# Patient Record
Sex: FEMALE | Race: WHITE | NOT HISPANIC OR LATINO | URBAN - NONMETROPOLITAN AREA
[De-identification: names, ages, dates, MRNs, and addresses within clinical notes are randomized per-mention and may not be internally consistent; named-entity substitution may affect disease eponyms.]

---

## 2018-07-19 NOTE — PATIENT DISCUSSION
CATARACTS, OU - VISUALLY SIGNIFICANT. PT TO CALL WHEN READY TO PROCEED WITH PREOP AND SURGERY. SCHEDULE OU.

## 2018-07-24 NOTE — PATIENT DISCUSSION
CATARACTS, OU - VISUALLY SIGNIFICANT. SCHEDULE _OS_ FIRST THEN LATER IN _OD_ DISCUSSED OPTION OF __STD OU___VS _STD/MANUAL LRI OU___. PATIENT UNDERSTANDS AND DESIRES __TO Claus Gutierrez TO [address] [place] [address] AND LET US KNOW___________________________.

## 2018-07-24 NOTE — PATIENT DISCUSSION
Surgery Counseling: I have discussed the option of scheduling surgery versus following, as well as the risks, benefits and alternatives of cataract surgery with the patient. It was explained that the surgery is medically indicated at this time, and it can be performed at the patient's option as delaying will cause no further deterioration, therefore there is no rush and there is no harm in waiting to have surgery. It was also explained that there is no guarantee that removing the cataract will improve their vision. The patient understands and desires to proceed with cataract surgery with the implantation of an intraocular lens to improve vision for __DRIVING AND TV______________. I have given the patient the prescribed regimen of the all-in-one drop to use before and after cataract surgery. They have elected to use the all-in-one option of Pred/Gati/Brom(prednisolone acetate,gatifloxacin,and bromfenac. Patient to administer as directed.

## 2018-08-22 NOTE — PATIENT DISCUSSION
S/P PE IOL, _OS__. DOING WELL. CONTINUE PRED-GATI-BROM IN THE SURGICAL EYE  FOR A TOTAL OF 3 WEEKS USE THEN DISCONTINUE. PATIENT DESIRES _std with man lri______IOL FOR 2ND EYE. SCHEDULE CATARACT SURGERY.

## 2018-08-23 NOTE — PROCEDURE NOTE: SURGICAL
<p>Prior to commencing surgery patient identification, surgical procedure, site, and side were confirmed by Dr. Shai Serrano. Following topical proparacaine anesthesia, the patient was positioned at the YAG laser, a contact lens coupled to the cornea with methylcellulose and an axial posterior capsulotomy performed without complication using 3.0 Mj x 30. Excess methylcellulose was washed from the eye, one drop of Alphagan was instilled and the patient returned to the holding area having tolerated the procedure well and without complication. </p><p>MRN:[id number]</p>

## 2019-03-19 NOTE — PATIENT DISCUSSION
OHTN, OU:  ELEVATED INTRAOCULAR PRESSURE WITHOUT OPTIC NERVE CHANGES. RECOMMEND GONIOSCOPY AND VISUAL FIELD EVALUATION WITH CLOSE OBSERVATION. RETURN FOR FOLLOW-UP AS SCHEDULED.

## 2020-02-04 ENCOUNTER — IMPORTED ENCOUNTER (OUTPATIENT)
Dept: URBAN - NONMETROPOLITAN AREA CLINIC 1 | Facility: CLINIC | Age: 70
End: 2020-02-04

## 2020-02-04 PROBLEM — H40.1134: Noted: 2020-02-04

## 2020-02-04 PROBLEM — H52.4: Noted: 2020-02-04

## 2020-02-04 PROBLEM — Z96.1: Noted: 2020-02-04

## 2020-02-04 PROBLEM — H35.3111: Noted: 2020-02-04

## 2020-02-04 PROBLEM — H26.493: Noted: 2020-02-04

## 2020-02-04 PROCEDURE — 99204 OFFICE O/P NEW MOD 45 MIN: CPT

## 2020-02-04 PROCEDURE — 92015 DETERMINE REFRACTIVE STATE: CPT

## 2020-02-04 NOTE — PATIENT DISCUSSION
Pseudophakia OU with PCO OU - Discussed diagnosis in detail with patient- Surgery was done by Dr. Victor M Tovar at [facility] in 2018 with iStents- PCO OU noted but stable and no treatment needed at this time - Continue to monitorPOAG OU- Discussed diagnosis in detail with patient- Patient has been followed by Dr. Victor M Tovar at [facility] and is currently on Azopt BID OU and Brimonidine BID OU but states that she will stop using them 2-3 weeks at a time  - iStent not visable today - IOP today OD 11 and OS 13- Cup to Disc noted at OD .7 and OS . 6- Continue to monitor ARMD OD - Discussed diagnosis in detail with patient- Recommend  patient start eye vitamins daily such as Preservision. Sample given 2/4/20- Recommend that patient start following the [address] patient to call or come into the office ASAP if any changes noted from today.  Grid given with instructions on how to use 2/4/20 - Continue to monitor- RTC 1 month F/U with OCT MAC and ONH Hyperopia / Presbyopia OU - Discussed diagnosis in detail with patient- New glasses RX given today- Continue to monitor; Dr's Notes: MR 2/4/20DFE 2/4/20

## 2020-04-28 ENCOUNTER — IMPORTED ENCOUNTER (OUTPATIENT)
Dept: URBAN - NONMETROPOLITAN AREA CLINIC 1 | Facility: CLINIC | Age: 70
End: 2020-04-28

## 2020-04-28 PROBLEM — Z96.1: Noted: 2020-04-28

## 2020-04-28 PROBLEM — H40.1134: Noted: 2020-04-28

## 2020-04-28 PROBLEM — H52.4: Noted: 2020-02-04

## 2020-04-28 PROBLEM — H26.493: Noted: 2020-04-28

## 2020-04-28 PROBLEM — H35.3111: Noted: 2020-04-28

## 2020-04-28 PROCEDURE — 92133 CPTRZD OPH DX IMG PST SGM ON: CPT

## 2020-04-28 PROCEDURE — 92020 GONIOSCOPY: CPT

## 2020-04-28 PROCEDURE — 92014 COMPRE OPH EXAM EST PT 1/>: CPT

## 2020-04-28 NOTE — PATIENT DISCUSSION
POAG OU- Discussed diagnosis in detail with patient- Patient has been followed by Dr. Aparna Howell at [facility] and is currently on Azopt BID OU and Brimonidine BID OU. Patient states today that she self D/V Azopt and is only using the Brimonidine BID OU- Patient may D/C Azopt at this time and to continue Brrimonidine - IOP today 11 OU- Cup to Disc noted at OD .7 and OS . 6- OCT done today OD shows Inferior NFL Thinning and OS shows No NFL thinning - Gonio done today Grade IV with light pigment OU iStent not visable today - Continue to monitor ARMD OD - Discussed diagnosis in detail with patient- Recommend  patient continue eye vitamins daily such as Preservision. Sample given2/4/20- Recommend that patient continue following the [address] patient to call or come into the office ASAP if any changes noted from today.  Grid given with instructions on how to use 2/4/20 - Continue to monitorPseudophakia OU with PCO OU - Discussed diagnosis in detail with patient- Surgery was done by Dr. Aparna Howell at [facility] in 2018 with iStents- PCO OU noted but stable and no treatment needed at this time - Continue to monitorHyperopia / Presbyopia OU - Discussed diagnosis in detail with patient- Continue to monitor Would like to get Dr. Shawanda Cedillo records 4/28/20; Dr's Notes: MR 2/4/20DFE 2/4/20OCT Valentino Bhardwaj 72 and MAC 4/28/20Gonio 4/28/20

## 2020-08-10 ENCOUNTER — IMPORTED ENCOUNTER (OUTPATIENT)
Dept: URBAN - NONMETROPOLITAN AREA CLINIC 1 | Facility: CLINIC | Age: 70
End: 2020-08-10

## 2020-08-10 PROBLEM — H26.493: Noted: 2020-04-28

## 2020-08-10 PROBLEM — H16.223: Noted: 2020-08-10

## 2020-08-10 PROBLEM — H35.3111: Noted: 2020-04-28

## 2020-08-10 PROBLEM — H52.4: Noted: 2020-08-10

## 2020-08-10 PROBLEM — Z96.1: Noted: 2020-04-28

## 2020-08-10 PROBLEM — H40.1134: Noted: 2020-04-28

## 2020-08-10 PROCEDURE — 92083 EXTENDED VISUAL FIELD XM: CPT

## 2020-08-10 PROCEDURE — 92133 CPTRZD OPH DX IMG PST SGM ON: CPT

## 2020-08-10 PROCEDURE — 99213 OFFICE O/P EST LOW 20 MIN: CPT

## 2020-08-10 PROCEDURE — 76514 ECHO EXAM OF EYE THICKNESS: CPT

## 2020-08-10 NOTE — PATIENT DISCUSSION
POAG OU Moderate- Discussed diagnosis in detail with patient- Patient has been followed by Dr. Luisa Cannon at [facility] and wascurrently on Azopt BID OU and Brimonidine BID OU. Patient states at last visit that she self D/C Azopt and was only using the Brimonidine BID OU- Istents OS done by Dr. Luisa Cannon at [facility]- Patient may D/C Azopt at this time and to continue Brrimonidine BID OU - IOP today 12 OU- Cup to Disc noted at OD .7 and OS . 6- OCT done today OD shows Inferior NFL Thinning and OS shows No NFL thinning - PACHY done today  and - Gonio done previously Grade IV with light pigment OU iStent not visable today - VF done today OD shows Fair field with Non Specific defects and OS shows Fair field with Nasal Defect - D/C Brimonidine BID OU- Start Travatan QHS OU  sample given today - Continue to monitor ARMD OD - Discussed diagnosis in detail with patient- Recommend  patient continue eye vitamins daily such as Preservision. Sample given 2/4/20- Recommend that patient continue following the [address] patient to call or come into the office ASAP if any changes noted from today.  Grid given with instructions on how to use 2/4/20 - Continue to monitorPseudophakia OU with PCO OU - Discussed diagnosis in detail with patient- Surgery was done by Dr. Luisa Cannon at [facility] in 2018 with iStents- PCO OU noted but stable and no treatment needed at this time - Continue to monitorDES OU- Discussed diagnosis in detail with patient- Discussed signs and symptoms of progression- Recommend patient drinking plenty of water and starting Omega 3’s - Recommend Refresh Relieva or Systane  throughout the day- Consider plugs in the future if no improvement- Recommend J & J baby shampoo to scrub lids daily - Continue to monitorHyperopia / Presbyopia OU - Discussed diagnosis in detail with patient- Continue to monitor; Dr's Notes: MR 2/4/20DFE 2/4/20OCT Valentino Bhardwaj 72 8/10/20VF 8/10/20PACHY 8/10/20Gonio 4/28/20

## 2021-03-15 ENCOUNTER — IMPORTED ENCOUNTER (OUTPATIENT)
Dept: URBAN - NONMETROPOLITAN AREA CLINIC 1 | Facility: CLINIC | Age: 71
End: 2021-03-15

## 2021-03-15 PROCEDURE — 92133 CPTRZD OPH DX IMG PST SGM ON: CPT

## 2021-03-15 PROCEDURE — 92014 COMPRE OPH EXAM EST PT 1/>: CPT

## 2021-03-15 NOTE — PATIENT DISCUSSION
POAG OU Moderate- Discussed diagnosis in detail with patient- Patient has been followed by Dr. Annemarie Jansen at [facility] and wascurrently on Azopt BID OU and Brimonidine BID OU. Patient states at last visit that she self D/C Azopt and was only using the Brimonidine BID OU- Istents OS done by Dr. Annemarie Jansen at [facility]- Patient may D/C Azopt at this time and to continue Brrimonidine BID OU - IOP today 14 OU- Cup to Disc noted at OD .7 and OS . 6- OCT done today OD shows Inferior NFL Thinning and OS shows No NFL thinning - PACHY done previously  and - Gonio done previously Grade IV with light pigment OU iStent not visable today - VF done previously OD shows Fair field with Non Specific defects and OS shows Fair field with Nasal Defect - D/C Brimonidine BID OU- Continue Travatan QHS OU - Continue to monitor ARMD OD - Discussed diagnosis in detail with patient- Recommend  patient continue eye vitamins daily such as Preservision. Sample given 2/4/20- Recommend that patient continue following the [address] patient to call or come into the office ASAP if any changes noted from today.  Grid given with instructions on how to use 2/4/20 - Continue to monitorPseudophakia OU with PCO OU - Discussed diagnosis in detail with patient- Surgery was done by Dr. Annemarie Jansen at [facility] in 2018 with iStents- PCO OU noted but stable and no treatment needed at this time - Continue to monitorDES OU- Discussed diagnosis in detail with patient- Discussed signs and symptoms of progression- Recommend patient drinking plenty of water and starting Omega 3’s - Recommend Refresh Relieva or Systane  throughout the day- Consider plugs in the future if no improvement- Recommend J & J baby shampoo to scrub lids daily - Continue to monitorHyperopia / Presbyopia OU - Discussed diagnosis in detail with patient- Continue to monitor; Dr's Notes: MR 2/4/20DFE 2/4/20OCT Valentino Bhardwaj 72 3/15/21VF 8/10/20PACHY 8/10/20Gonio 4/28/20

## 2021-12-21 ENCOUNTER — IMPORTED ENCOUNTER (OUTPATIENT)
Dept: URBAN - NONMETROPOLITAN AREA CLINIC 1 | Facility: CLINIC | Age: 71
End: 2021-12-21

## 2021-12-21 PROCEDURE — 99214 OFFICE O/P EST MOD 30 MIN: CPT

## 2021-12-21 PROCEDURE — 92133 CPTRZD OPH DX IMG PST SGM ON: CPT

## 2021-12-21 PROCEDURE — 92015 DETERMINE REFRACTIVE STATE: CPT

## 2021-12-21 NOTE — PATIENT DISCUSSION
POAG OU Moderate- Discussed diagnosis in detail with patient- Patient has been followed by Dr. Ariela Stauffer at [facility] and wascurrently on Azopt BID OU and Brimonidine BID OU. Patient states at last visit that she self D/C Azopt and was only using the Brimonidine BID OU- Istents OS done by Dr. Ariela Stauffer at [facility]- Patient may D/C Azopt at this time and to continue Brrimonidine BID OU - IOP today 14 OU- Cup to Disc noted at OD .7 and OS . 6- OCT done today OD shows Inferior NFL Thinning and OS shows No NFL thinning - PACHY done previously  and - Gonio done previously Grade IV with light pigment OU iStent not visable today - VF done previously OD shows Fair field with Non Specific defects and OS shows Fair field with Nasal Defect - D/C Brimonidine BID OU- Continue Travatan QHS OU - Continue to monitor ARMD OD - Discussed diagnosis in detail with patient- Recommend  patient continue eye vitamins daily such as Preservision. Sample given 2/4/20- Recommend that patient continue following the [address] patient to call or come into the office ASAP if any changes noted from today.  Grid given with instructions on how to use 2/4/20 - Continue to monitorPseudophakia OU with PCO OU - Discussed diagnosis in detail with patient- Surgery was done by Dr. Ariela Stauffer at [facility] in 2018 with iStents- PCO OU noted but stable and no treatment needed at this time - Continue to monitorDES OU- Discussed diagnosis in detail with patient- Discussed signs and symptoms of progression- Recommend patient drinking plenty of water and starting Omega 3’s - Recommend Refresh Relieva or Systane  throughout the day- Consider plugs in the future if no improvement- Recommend J & J baby shampoo to scrub lids daily - Continue to monitorHyperopia / Presbyopia OU - Discussed diagnosis in detail with patient- MR re-checked by Dr. Giovanni Peterson and new glasses Rx given - Continue to monitor; Dr's Notes: MR 2/4/20DFE 2/4/20OCT Valentino Bhardwaj 72 12/21/21VF 8/10/20PACHY 8/10/20Gonio 4/28/20

## 2022-04-09 ASSESSMENT — VISUAL ACUITY
OS_SC: 20/25+
OD_SC: 20/50
OS_SC: 20/20
OD_SC: 20/20
OD_SC: 20/25
OS_SC: 20/29
OS_SC: 20/25-
OD_SC: 20/20
OS_CC: 20/29
OD_GLARE: 20/250
OD_PH: 20/29
OS_SC: 20/30-2
OD_SC: 20/25-1
OS_GLARE: 20/63+

## 2022-04-09 ASSESSMENT — PACHYMETRY
OS_CT_UM: 634
OS_CT_UM: 634
OD_CT_UM: 558
OD_CT_UM: 558

## 2022-04-09 ASSESSMENT — TONOMETRY
OS_IOP_MMHG: 14
OS_IOP_MMHG: 14
OD_IOP_MMHG: 11
OS_IOP_MMHG: 12
OD_IOP_MMHG: 14
OD_IOP_MMHG: 14
OD_IOP_MMHG: 12
OS_IOP_MMHG: 11

## 2022-07-01 ENCOUNTER — FOLLOW UP (OUTPATIENT)
Dept: URBAN - NONMETROPOLITAN AREA CLINIC 1 | Facility: CLINIC | Age: 72
End: 2022-07-01

## 2022-07-01 DIAGNOSIS — H40.1134: ICD-10-CM

## 2022-07-01 DIAGNOSIS — H35.3111: ICD-10-CM

## 2022-07-01 PROCEDURE — 99214 OFFICE O/P EST MOD 30 MIN: CPT

## 2022-07-01 PROCEDURE — 92133 CPTRZD OPH DX IMG PST SGM ON: CPT

## 2022-07-01 PROCEDURE — 92134 CPTRZ OPH DX IMG PST SGM RTA: CPT

## 2022-07-01 PROCEDURE — 92083 EXTENDED VISUAL FIELD XM: CPT

## 2022-07-01 ASSESSMENT — TONOMETRY
OD_IOP_MMHG: 12
OS_IOP_MMHG: 12

## 2022-07-01 ASSESSMENT — VISUAL ACUITY
OD_CC: 20/30-1
OS_CC: 20/30-2

## 2022-07-01 NOTE — PATIENT DISCUSSION
Importance of daily AREDS II study multivitamin and Amsler Grid checks discussed with patient. Patient to follow-up immediately with any new onset of decreased vision and/or metamorphopsia. Grid give today:  7/1/2022. Sample of PreserVision given today:  7/1/2022.

## 2022-07-01 NOTE — PATIENT DISCUSSION
VF done previously OD shows Fair field with Non Specific defects and OS shows Fair field with Nasal Defect.

## 2022-07-01 NOTE — PATIENT DISCUSSION
Explained to patient again today that her vision is worse in the left eye, without correction. There is nothing I can do about that, but glasses do help.

## 2022-07-01 NOTE — PATIENT DISCUSSION
Hx of iStent placement OS, done by Dr. Val Noriega at [facility].

## 2022-07-01 NOTE — PATIENT DISCUSSION
Patient has been followed by Dr. Rachel Santos at [facility] and was placed on Azopt BID OU and Brimonidine BID OU. Patient no longer uses these medications.

## 2023-02-24 NOTE — PATIENT DISCUSSION
Pre-Op 2nd Eye Counseling: The patient has noticed an improvement in their visual symptoms in the operative eye. The patient complains of decreased vision in the fellow eye when __DRIVING AND WATCHING TV__________. It was explained to the patient that the decision to proceed with cataract surgery in the fellow eye is entirely a separate decision from the surgical eye. All of the same risks, benefits and alternatives are reviewed with the patient again. The patient does feel the vision in the non-operative eye is limiting their daily activities and elects to proceed with cataract surgery in the ___RIGHT____ eye. . I have given the patient the prescribed regimen of  drops to use before and after cataract surgery. Patient to administer as directed. yes...

## 2024-01-15 ENCOUNTER — FOLLOW UP (OUTPATIENT)
Dept: URBAN - NONMETROPOLITAN AREA CLINIC 1 | Facility: CLINIC | Age: 74
End: 2024-01-15

## 2024-01-15 DIAGNOSIS — H40.1131: ICD-10-CM

## 2024-01-15 DIAGNOSIS — H52.4: ICD-10-CM

## 2024-01-15 DIAGNOSIS — H35.3111: ICD-10-CM

## 2024-01-15 PROCEDURE — 92083 EXTENDED VISUAL FIELD XM: CPT

## 2024-01-15 PROCEDURE — 99214 OFFICE O/P EST MOD 30 MIN: CPT

## 2024-01-15 PROCEDURE — 92015 DETERMINE REFRACTIVE STATE: CPT

## 2024-01-15 RX ORDER — TRAVOPROST 0.04 MG/ML: 1 SOLUTION/ DROPS OPHTHALMIC EVERY EVENING

## 2024-01-15 RX ORDER — DORZOLAMIDE 20 MG/ML: 1 SOLUTION/ DROPS OPHTHALMIC TWICE A DAY

## 2024-01-15 ASSESSMENT — TONOMETRY
OD_IOP_MMHG: 24
OS_IOP_MMHG: 23

## 2024-01-15 ASSESSMENT — VISUAL ACUITY
OD_CC: 20/40
OD_PH: 20/30
OS_CC: 20/30-1

## 2024-05-13 ENCOUNTER — FOLLOW UP (OUTPATIENT)
Dept: URBAN - NONMETROPOLITAN AREA CLINIC 1 | Facility: CLINIC | Age: 74
End: 2024-05-13

## 2024-05-13 DIAGNOSIS — H40.1131: ICD-10-CM

## 2024-05-13 DIAGNOSIS — H35.3111: ICD-10-CM

## 2024-05-13 PROCEDURE — 92134 CPTRZ OPH DX IMG PST SGM RTA: CPT

## 2024-05-13 PROCEDURE — 99213 OFFICE O/P EST LOW 20 MIN: CPT

## 2024-05-13 RX ORDER — BRINZOLAMIDE 10 MG/ML: 1 SUSPENSION/ DROPS OPHTHALMIC TWICE A DAY

## 2024-05-13 ASSESSMENT — VISUAL ACUITY
OU_CC: 20/25
OD_CC: 20/29-1
OS_CC: 20/25-1

## 2024-05-13 ASSESSMENT — TONOMETRY
OD_IOP_MMHG: 18
OS_IOP_MMHG: 18

## 2025-05-13 ENCOUNTER — FOLLOW UP (OUTPATIENT)
Age: 75
End: 2025-05-13

## 2025-05-13 DIAGNOSIS — H40.1112: ICD-10-CM

## 2025-05-13 DIAGNOSIS — H40.1121: ICD-10-CM

## 2025-05-13 DIAGNOSIS — H35.3111: ICD-10-CM

## 2025-05-13 PROCEDURE — 92083 EXTENDED VISUAL FIELD XM: CPT

## 2025-05-13 PROCEDURE — 99214 OFFICE O/P EST MOD 30 MIN: CPT

## 2025-05-13 PROCEDURE — 92133 CPTRZD OPH DX IMG PST SGM ON: CPT

## 2025-05-13 RX ORDER — BRIMONIDINE TARTRATE 1.5 MG/ML: 1 SOLUTION/ DROPS OPHTHALMIC TWICE A DAY

## 2025-08-28 ENCOUNTER — FOLLOW UP (OUTPATIENT)
Age: 75
End: 2025-08-28

## 2025-08-28 DIAGNOSIS — H40.1121: ICD-10-CM

## 2025-08-28 DIAGNOSIS — H40.1112: ICD-10-CM

## 2025-08-28 DIAGNOSIS — H35.3111: ICD-10-CM

## 2025-08-28 PROCEDURE — 99213 OFFICE O/P EST LOW 20 MIN: CPT

## 2025-08-28 PROCEDURE — 92250 FUNDUS PHOTOGRAPHY W/I&R: CPT
